# Patient Record
Sex: FEMALE | Race: WHITE
[De-identification: names, ages, dates, MRNs, and addresses within clinical notes are randomized per-mention and may not be internally consistent; named-entity substitution may affect disease eponyms.]

---

## 2020-05-04 ENCOUNTER — TRANSCRIPTION ENCOUNTER (OUTPATIENT)
Age: 35
End: 2020-05-04

## 2020-05-17 ENCOUNTER — TRANSCRIPTION ENCOUNTER (OUTPATIENT)
Age: 35
End: 2020-05-17

## 2021-07-28 ENCOUNTER — OUTPATIENT (OUTPATIENT)
Dept: OUTPATIENT SERVICES | Facility: HOSPITAL | Age: 36
LOS: 1 days | End: 2021-07-28

## 2021-08-05 ENCOUNTER — OUTPATIENT (OUTPATIENT)
Dept: OUTPATIENT SERVICES | Facility: HOSPITAL | Age: 36
LOS: 1 days | End: 2021-08-05

## 2022-03-04 DIAGNOSIS — M25.561 PAIN IN RIGHT KNEE: ICD-10-CM

## 2022-03-04 PROBLEM — Z00.00 ENCOUNTER FOR PREVENTIVE HEALTH EXAMINATION: Status: ACTIVE | Noted: 2022-03-04

## 2022-03-05 ENCOUNTER — APPOINTMENT (OUTPATIENT)
Dept: ORTHOPEDIC SURGERY | Facility: CLINIC | Age: 37
End: 2022-03-05

## 2022-04-05 ENCOUNTER — OUTPATIENT (OUTPATIENT)
Dept: OUTPATIENT SERVICES | Facility: HOSPITAL | Age: 37
LOS: 1 days | End: 2022-04-05

## 2022-04-06 DIAGNOSIS — M23.91 UNSPECIFIED INTERNAL DERANGEMENT OF RIGHT KNEE: ICD-10-CM

## 2022-08-04 ENCOUNTER — NON-APPOINTMENT (OUTPATIENT)
Age: 37
End: 2022-08-04

## 2022-09-12 ENCOUNTER — OUTPATIENT (OUTPATIENT)
Dept: OUTPATIENT SERVICES | Facility: HOSPITAL | Age: 37
LOS: 1 days | End: 2022-09-12

## 2022-09-12 DIAGNOSIS — Z00.00 ENCOUNTER FOR GENERAL ADULT MEDICAL EXAMINATION WITHOUT ABNORMAL FINDINGS: ICD-10-CM

## 2024-12-26 ENCOUNTER — EMERGENCY (EMERGENCY)
Facility: HOSPITAL | Age: 39
LOS: 1 days | Discharge: HOME | End: 2024-12-26
Attending: EMERGENCY MEDICINE
Payer: COMMERCIAL

## 2024-12-26 VITALS
RESPIRATION RATE: 16 BRPM | TEMPERATURE: 97 F | OXYGEN SATURATION: 99 % | SYSTOLIC BLOOD PRESSURE: 108 MMHG | HEART RATE: 71 BPM | DIASTOLIC BLOOD PRESSURE: 72 MMHG

## 2024-12-26 VITALS
RESPIRATION RATE: 18 BRPM | DIASTOLIC BLOOD PRESSURE: 70 MMHG | SYSTOLIC BLOOD PRESSURE: 105 MMHG | OXYGEN SATURATION: 100 % | HEART RATE: 72 BPM | TEMPERATURE: 98 F

## 2024-12-26 LAB — HCG UR QL: NEGATIVE — SIGNIFICANT CHANGE UP

## 2024-12-26 PROCEDURE — 72131 CT LUMBAR SPINE W/O DYE: CPT | Mod: 26,MC

## 2024-12-26 PROCEDURE — 72190 X-RAY EXAM OF PELVIS: CPT

## 2024-12-26 PROCEDURE — 72192 CT PELVIS W/O DYE: CPT | Mod: 26,MC

## 2024-12-26 PROCEDURE — 99284 EMERGENCY DEPT VISIT MOD MDM: CPT

## 2024-12-26 PROCEDURE — 99284 EMERGENCY DEPT VISIT MOD MDM: CPT | Mod: 25

## 2024-12-26 PROCEDURE — 72131 CT LUMBAR SPINE W/O DYE: CPT | Mod: MC

## 2024-12-26 PROCEDURE — 72192 CT PELVIS W/O DYE: CPT | Mod: MC

## 2024-12-26 PROCEDURE — 81025 URINE PREGNANCY TEST: CPT

## 2024-12-26 PROCEDURE — 72190 X-RAY EXAM OF PELVIS: CPT | Mod: 26

## 2024-12-26 RX ORDER — KETOROLAC TROMETHAMINE 30 MG/ML
30 INJECTION INTRAMUSCULAR; INTRAVENOUS ONCE
Refills: 0 | Status: DISCONTINUED | OUTPATIENT
Start: 2024-12-26 | End: 2024-12-26

## 2024-12-26 RX ORDER — METHOCARBAMOL 500 MG/1
1500 TABLET, FILM COATED ORAL ONCE
Refills: 0 | Status: COMPLETED | OUTPATIENT
Start: 2024-12-26 | End: 2024-12-26

## 2024-12-26 RX ORDER — ACETAMINOPHEN 500MG 500 MG/1
975 TABLET, COATED ORAL ONCE
Refills: 0 | Status: COMPLETED | OUTPATIENT
Start: 2024-12-26 | End: 2024-12-26

## 2024-12-26 RX ORDER — LIDOCAINE 40 MG/G
1 CREAM TOPICAL ONCE
Refills: 0 | Status: COMPLETED | OUTPATIENT
Start: 2024-12-26 | End: 2024-12-26

## 2024-12-26 RX ORDER — METHOCARBAMOL 500 MG/1
1 TABLET, FILM COATED ORAL
Qty: 20 | Refills: 0
Start: 2024-12-26 | End: 2024-12-30

## 2024-12-26 RX ADMIN — ACETAMINOPHEN 500MG 975 MILLIGRAM(S): 500 TABLET, COATED ORAL at 08:59

## 2024-12-26 RX ADMIN — LIDOCAINE 1 PATCH: 40 CREAM TOPICAL at 08:58

## 2024-12-26 RX ADMIN — METHOCARBAMOL 1500 MILLIGRAM(S): 500 TABLET, FILM COATED ORAL at 03:42

## 2024-12-26 NOTE — ED PROVIDER NOTE - PATIENT PORTAL LINK FT
You can access the FollowMyHealth Patient Portal offered by [facility] by registering at the following website: [email or web address]. By joining [employer]’s FollowMyHealth portal, you will also be able to view your health information using other applications (apps) compatible with our system.

## 2024-12-26 NOTE — ED PROVIDER NOTE - OBJECTIVE STATEMENT
39 female with no pmhx presents to the ED for fall down stairs and landing on back. She was walking to bed in her home when she slipped down the stairs. Pain is mostly the right lower back into Right buttock. Spouse at bedside. Pain No cuts/lacerations/bruises. She has not ambulated since fall. No bowel/bladder incontinence, no saddle anesthesia, no fever/chills, no chest pain, no abd pain, no headstrike. Able to ambulate. No allergies. Rx. [facility] Took 2 aleeve PTA and motrin.

## 2024-12-26 NOTE — ED PROVIDER NOTE - NSFOLLOWUPINSTRUCTIONS_ED_ALL_ED_FT
- Follow up with your doctor within 2-3 days.   - Return to the ED for any new or worsening symptoms included but not limited to weakness, urinary symptoms, numbness, difficulty walking, bowel/bladder incontinence, fevers, etc  - Avoid heavy lifting, significant exertion  - Apply heating pads/warm compresses to affected area while resting   - May follow-up with spine doctor listed for further evaluation if symptoms persist    Back Pain    Back pain is very common in adults. The cause of back pain is rarely dangerous and the pain often gets better over time. The cause of your back pain may not be known and may include strain of muscles or ligaments, degeneration of the spinal disks, or arthritis. Occasionally the pain may radiate down your leg(s). Over-the-counter medicines to reduce pain and inflammation are often the most helpful. Stretching and remaining active frequently helps the healing process.     SEEK IMMEDIATE MEDICAL CARE IF YOU HAVE ANY OF THE FOLLOWING SYMPTOMS: bowel or bladder control problems, unusual weakness or numbness in your arms or legs, nausea or vomiting, abdominal pain, fever, dizziness/lightheadedness.

## 2024-12-26 NOTE — ED PROVIDER NOTE - CARE PROVIDER_API CALL
Khai Roberson  Neurosurgery  [address] [place] [id number]  Phone: [phone]  Fax: [phone]  Follow Up Time:

## 2024-12-26 NOTE — ED PROVIDER NOTE - ATTENDING APP SHARED VISIT CONTRIBUTION OF CARE
Amanda [id number]-old female with no known medical problems presents with low back pain after after she slipped and fell on 2-3 steps at home and landed on her back.  Patient has pain when she moves and is able to walk.  Patient has no difficulty urinating and has no numbness or weakness.  Patient denies any hit to the head or LOC.  Patient states that the stairs are made up of void.  Patient denies any pregnancy    Patient is alert well-appearing female, S1-S2 normal regular, bilateral clear breath sounds, abdomen is soft nontender nondistended pelvis stable focal left sacroiliac joint tenderness, painful range of motion at lumbar spine with no midline tenderness, no saddle anesthesia, neuroexam is alert oriented x 3 no focal deficits straight leg raise negative bilaterally, skin warm dry good turgor    Plan to do x-ray of the pelvis x-ray of the pelvis shows no acute fracture but has diffuse gas and sacral joint cannot be interpreted and given the focal SI joint tenderness we will do CT lumbar spine a CT pelvis.

## 2024-12-26 NOTE — ED ADULT TRIAGE NOTE - CHIEF COMPLAINT QUOTE
Pt. YASMIN c/o R sided LBP. States she slid down steps on her butt at approx 930, took 2 aleeve w/o relief.

## 2024-12-26 NOTE — ED ADULT NURSE REASSESSMENT NOTE - NS ED NURSE REASSESS COMMENT FT1
Assumed care of pt at this time. Patient a&ox4, no acute distress, resp nonlabored, resting comfortably in bed. Pt has no complaints a this time. Denies headache, dizziness, chest pain, palpitations, SOB, weakness at this time. Patient awaiting CT results. Safety maintained with bed locked and in lowest position.

## 2024-12-26 NOTE — ED PROVIDER NOTE - CLINICAL SUMMARY MEDICAL DECISION MAKING FREE TEXT BOX
39 female with no pmhx presents to the ED for fall down stairs and landing on back. She was walking to bed in her home when she slipped down the stairs. Upon exam, well appearing female with Ttp to right lumbar paraspinals into right buttock region. 5/5 strength noted bilat. Hip flexion/extension intact bilat. No midline cervical/thoracic/lumbar ttp. Full ROM ext x 4. Able to ambulate in ED. CT pending 39 female with no pmhx presents to the ED for fall down stairs and landing on back. She was walking to bed in her home when she slipped down the stairs. Upon exam, well appearing female with Ttp to right lumbar paraspinals into right buttock region. 5/5 strength noted bilat. Hip flexion/extension intact bilat. No midline cervical/thoracic/lumbar ttp. Full ROM ext x 4. Able to ambulate in ED. CT pending  GRAHAM Portillo: received sign out from GRAHAM Hull pending CT scans. CT lumbar spine negative for fx, demonstrated "small left posterolateral disc protrusion L4-5. Small bulge L5-S1 more prevalent to the left." CT pelvis negative. Results d/w pt. pt feeling better, able to ambulate without difficulty. provided copy of all results. return precautions discussed

## 2024-12-26 NOTE — ED PROVIDER NOTE - PHYSICAL EXAMINATION
Gen: No acute distress, non toxic female, speaking in full sentences   HEENT: NCAT, Mucous membranes moist, Oropharynx without exudates, uvula midline  Eyes: pink conjunctivae, EOMI, PERRL  CV: RRR, nl s1/s2 noted   Resp: CTAB, normal rate and effort  GI: Abdomen soft, NT, ND. No rebound, no guarding  Neuro: A&O x 3, sensorimotor intact without deficits   MSK: (+) Ttp to right lumbar paraspinals into right buttock region. 5/5 strength noted bilat. Hip flexion/extension intact bilat. No midline cervical/thoracic/lumbar ttp. Full ROM ext x 4  Skin: No rashes. intact and perfused  Ambulatory in the ED

## 2024-12-26 NOTE — ED PROVIDER NOTE - PROGRESS NOTE DETAILS
Signed out to Day PA. Pending CT pelvis ~GRAHAM Hull Signed out to Day PA. Pending CT pelvis result ~GRAHAM Hull Gia: neuro intact, improved, dc with Sx mgt and spine f/u